# Patient Record
Sex: MALE | Race: WHITE | NOT HISPANIC OR LATINO | Employment: FULL TIME | URBAN - METROPOLITAN AREA
[De-identification: names, ages, dates, MRNs, and addresses within clinical notes are randomized per-mention and may not be internally consistent; named-entity substitution may affect disease eponyms.]

---

## 2017-01-06 ENCOUNTER — APPOINTMENT (OUTPATIENT)
Dept: LAB | Facility: HOSPITAL | Age: 67
End: 2017-01-06
Attending: ORTHOPAEDIC SURGERY
Payer: COMMERCIAL

## 2017-01-06 ENCOUNTER — TRANSCRIBE ORDERS (OUTPATIENT)
Dept: ADMINISTRATIVE | Facility: HOSPITAL | Age: 67
End: 2017-01-06

## 2017-01-06 ENCOUNTER — HOSPITAL ENCOUNTER (OUTPATIENT)
Dept: RADIOLOGY | Facility: HOSPITAL | Age: 67
Discharge: HOME | End: 2017-01-06
Attending: ORTHOPAEDIC SURGERY
Payer: COMMERCIAL

## 2017-01-06 DIAGNOSIS — M25.512 LEFT SHOULDER PAIN, UNSPECIFIED CHRONICITY: ICD-10-CM

## 2017-01-06 DIAGNOSIS — M75.122 COMPLETE ROTATOR CUFF TEAR OR RUPTURE OF LEFT SHOULDER, NOT SPECIFIED AS TRAUMATIC: Primary | ICD-10-CM

## 2017-01-06 DIAGNOSIS — M75.122 COMPLETE ROTATOR CUFF TEAR OR RUPTURE OF LEFT SHOULDER, NOT SPECIFIED AS TRAUMATIC: ICD-10-CM

## 2017-01-06 LAB
APTT PPP: 27 SECONDS (ref 24–36)
BASOPHILS # BLD AUTO: 0.03 THOUSANDS/ΜL (ref 0–0.1)
BASOPHILS NFR BLD AUTO: 0 % (ref 0–1)
EOSINOPHIL # BLD AUTO: 0.31 THOUSAND/ΜL (ref 0–0.61)
EOSINOPHIL NFR BLD AUTO: 4 % (ref 0–6)
ERYTHROCYTE [DISTWIDTH] IN BLOOD BY AUTOMATED COUNT: 12 % (ref 11.6–15.1)
HCT VFR BLD AUTO: 45.2 % (ref 36.5–49.3)
HGB BLD-MCNC: 15.3 G/DL (ref 12–17)
INR PPP: 1.01 (ref 0.86–1.16)
LYMPHOCYTES # BLD AUTO: 1.43 THOUSANDS/ΜL (ref 0.6–4.47)
LYMPHOCYTES NFR BLD AUTO: 19 % (ref 14–44)
MCH RBC QN AUTO: 28.3 PG (ref 26.8–34.3)
MCHC RBC AUTO-ENTMCNC: 33.8 G/DL (ref 31.4–37.4)
MCV RBC AUTO: 84 FL (ref 82–98)
MONOCYTES # BLD AUTO: 0.79 THOUSAND/ΜL (ref 0.17–1.22)
MONOCYTES NFR BLD AUTO: 11 % (ref 4–12)
NEUTROPHILS # BLD AUTO: 4.93 THOUSANDS/ΜL (ref 1.85–7.62)
NEUTS SEG NFR BLD AUTO: 66 % (ref 43–75)
NRBC BLD AUTO-RTO: 0 /100 WBCS
PLATELET # BLD AUTO: 189 THOUSANDS/UL (ref 149–390)
PMV BLD AUTO: 9.2 FL (ref 8.9–12.7)
PROTHROMBIN TIME: 13.2 SECONDS (ref 12–14.3)
RBC # BLD AUTO: 5.4 MILLION/UL (ref 3.88–5.62)
WBC # BLD AUTO: 7.5 THOUSAND/UL (ref 4.31–10.16)

## 2017-01-06 PROCEDURE — 36415 COLL VENOUS BLD VENIPUNCTURE: CPT

## 2017-01-06 PROCEDURE — 85730 THROMBOPLASTIN TIME PARTIAL: CPT

## 2017-01-06 PROCEDURE — 93005 ELECTROCARDIOGRAM TRACING: CPT

## 2017-01-06 PROCEDURE — 85610 PROTHROMBIN TIME: CPT

## 2017-01-06 PROCEDURE — 85025 COMPLETE CBC W/AUTO DIFF WBC: CPT

## 2017-01-06 PROCEDURE — 71020 HB CHEST X-RAY 2VW FRONTAL&LATL: CPT

## 2017-01-09 LAB
ATRIAL RATE: 56 BPM
P AXIS: 56 DEGREES
PR INTERVAL: 172 MS
QRS AXIS: 47 DEGREES
QRSD INTERVAL: 96 MS
QT INTERVAL: 414 MS
QTC INTERVAL: 399 MS
T WAVE AXIS: 52 DEGREES
VENTRICULAR RATE: 56 BPM

## 2017-01-09 RX ORDER — ROSUVASTATIN CALCIUM 40 MG/1
10 TABLET, COATED ORAL DAILY
COMMUNITY

## 2017-01-09 RX ORDER — CHLORAL HYDRATE 500 MG
1000 CAPSULE ORAL DAILY
COMMUNITY

## 2017-01-09 RX ORDER — VALSARTAN AND HYDROCHLOROTHIAZIDE 80; 12.5 MG/1; MG/1
1 TABLET, FILM COATED ORAL DAILY
COMMUNITY

## 2017-01-16 ENCOUNTER — ANESTHESIA (OUTPATIENT)
Dept: PERIOP | Facility: HOSPITAL | Age: 67
End: 2017-01-16
Payer: COMMERCIAL

## 2017-01-16 ENCOUNTER — ANESTHESIA EVENT (OUTPATIENT)
Dept: PERIOP | Facility: HOSPITAL | Age: 67
End: 2017-01-16
Payer: COMMERCIAL

## 2017-01-16 ENCOUNTER — HOSPITAL ENCOUNTER (OUTPATIENT)
Facility: HOSPITAL | Age: 67
Setting detail: OUTPATIENT SURGERY
Discharge: HOME | End: 2017-01-16
Attending: ORTHOPAEDIC SURGERY | Admitting: ORTHOPAEDIC SURGERY
Payer: COMMERCIAL

## 2017-01-16 VITALS
BODY MASS INDEX: 26.36 KG/M2 | HEIGHT: 69 IN | DIASTOLIC BLOOD PRESSURE: 84 MMHG | RESPIRATION RATE: 19 BRPM | SYSTOLIC BLOOD PRESSURE: 145 MMHG | TEMPERATURE: 97.9 F | WEIGHT: 178 LBS | OXYGEN SATURATION: 94 % | HEART RATE: 81 BPM

## 2017-01-16 DIAGNOSIS — M75.122 COMPLETE TEAR OF LEFT ROTATOR CUFF: Primary | ICD-10-CM

## 2017-01-16 PROBLEM — M25.519 SHOULDER PAIN: Status: ACTIVE | Noted: 2017-01-16

## 2017-01-16 PROBLEM — Z18.9: Status: ACTIVE | Noted: 2017-01-16

## 2017-01-16 PROBLEM — I10 HYPERTENSION: Status: ACTIVE | Noted: 2017-01-16

## 2017-01-16 PROBLEM — H44.709: Status: ACTIVE | Noted: 2017-01-16

## 2017-01-16 PROBLEM — C61 CA OF PROSTATE (HCC): Status: ACTIVE | Noted: 2017-01-16

## 2017-01-16 PROCEDURE — C1713 ANCHOR/SCREW BN/BN,TIS/BN: HCPCS | Performed by: ORTHOPAEDIC SURGERY

## 2017-01-16 DEVICE — SYSTEM IMPLANT SPEEDBRIDGE W/4.75 BCMPS SWIVELOCK C: Type: IMPLANTABLE DEVICE | Site: SHOULDER | Status: FUNCTIONAL

## 2017-01-16 RX ORDER — LIDOCAINE HYDROCHLORIDE 10 MG/ML
INJECTION, SOLUTION INFILTRATION; PERINEURAL AS NEEDED
Status: DISCONTINUED | OUTPATIENT
Start: 2017-01-16 | End: 2017-01-16 | Stop reason: SURG

## 2017-01-16 RX ORDER — FENTANYL CITRATE 50 UG/ML
INJECTION, SOLUTION INTRAMUSCULAR; INTRAVENOUS AS NEEDED
Status: DISCONTINUED | OUTPATIENT
Start: 2017-01-16 | End: 2017-01-16 | Stop reason: SURG

## 2017-01-16 RX ORDER — ROPIVACAINE HYDROCHLORIDE 5 MG/ML
INJECTION, SOLUTION EPIDURAL; INFILTRATION; PERINEURAL AS NEEDED
Status: DISCONTINUED | OUTPATIENT
Start: 2017-01-16 | End: 2017-01-16 | Stop reason: SURG

## 2017-01-16 RX ORDER — ALBUMIN, HUMAN INJ 5% 5 %
SOLUTION INTRAVENOUS CONTINUOUS PRN
Status: DISCONTINUED | OUTPATIENT
Start: 2017-01-16 | End: 2017-01-16 | Stop reason: SURG

## 2017-01-16 RX ORDER — EPHEDRINE SULFATE 50 MG/ML
INJECTION, SOLUTION INTRAVENOUS AS NEEDED
Status: DISCONTINUED | OUTPATIENT
Start: 2017-01-16 | End: 2017-01-16 | Stop reason: SURG

## 2017-01-16 RX ORDER — ONDANSETRON 2 MG/ML
INJECTION INTRAMUSCULAR; INTRAVENOUS AS NEEDED
Status: DISCONTINUED | OUTPATIENT
Start: 2017-01-16 | End: 2017-01-16 | Stop reason: SURG

## 2017-01-16 RX ORDER — MIDAZOLAM HYDROCHLORIDE 1 MG/ML
INJECTION INTRAMUSCULAR; INTRAVENOUS AS NEEDED
Status: DISCONTINUED | OUTPATIENT
Start: 2017-01-16 | End: 2017-01-16 | Stop reason: SURG

## 2017-01-16 RX ORDER — ONDANSETRON 2 MG/ML
4 INJECTION INTRAMUSCULAR; INTRAVENOUS ONCE AS NEEDED
Status: DISCONTINUED | OUTPATIENT
Start: 2017-01-16 | End: 2017-01-16 | Stop reason: HOSPADM

## 2017-01-16 RX ORDER — OXYCODONE HYDROCHLORIDE AND ACETAMINOPHEN 5; 325 MG/1; MG/1
1 TABLET ORAL EVERY 4 HOURS PRN
Qty: 50 TABLET | Refills: 0 | Status: SHIPPED | OUTPATIENT
Start: 2017-01-16 | End: 2017-01-26

## 2017-01-16 RX ORDER — SODIUM CHLORIDE, SODIUM LACTATE, POTASSIUM CHLORIDE, CALCIUM CHLORIDE 600; 310; 30; 20 MG/100ML; MG/100ML; MG/100ML; MG/100ML
50 INJECTION, SOLUTION INTRAVENOUS CONTINUOUS
Status: DISCONTINUED | OUTPATIENT
Start: 2017-01-16 | End: 2017-01-16 | Stop reason: HOSPADM

## 2017-01-16 RX ORDER — SUCCINYLCHOLINE CHLORIDE 20 MG/ML
INJECTION INTRAMUSCULAR; INTRAVENOUS AS NEEDED
Status: DISCONTINUED | OUTPATIENT
Start: 2017-01-16 | End: 2017-01-16 | Stop reason: SURG

## 2017-01-16 RX ORDER — CEPHALEXIN 500 MG/1
500 CAPSULE ORAL 4 TIMES DAILY
Qty: 4 CAPSULE | Refills: 0 | Status: SHIPPED | OUTPATIENT
Start: 2017-01-16 | End: 2017-01-17

## 2017-01-16 RX ORDER — LIDOCAINE HYDROCHLORIDE AND EPINEPHRINE 10; 10 MG/ML; UG/ML
INJECTION, SOLUTION INFILTRATION; PERINEURAL AS NEEDED
Status: DISCONTINUED | OUTPATIENT
Start: 2017-01-16 | End: 2017-01-16 | Stop reason: HOSPADM

## 2017-01-16 RX ORDER — SODIUM CHLORIDE, SODIUM LACTATE, POTASSIUM CHLORIDE, CALCIUM CHLORIDE 600; 310; 30; 20 MG/100ML; MG/100ML; MG/100ML; MG/100ML
50 INJECTION, SOLUTION INTRAVENOUS CONTINUOUS
Status: DISCONTINUED | OUTPATIENT
Start: 2017-01-16 | End: 2017-01-16

## 2017-01-16 RX ORDER — DEXTROSE AND SODIUM CHLORIDE 5; .45 G/100ML; G/100ML
50 INJECTION, SOLUTION INTRAVENOUS CONTINUOUS
Status: DISCONTINUED | OUTPATIENT
Start: 2017-01-16 | End: 2017-01-16 | Stop reason: HOSPADM

## 2017-01-16 RX ORDER — PROPOFOL 10 MG/ML
INJECTION, EMULSION INTRAVENOUS AS NEEDED
Status: DISCONTINUED | OUTPATIENT
Start: 2017-01-16 | End: 2017-01-16 | Stop reason: SURG

## 2017-01-16 RX ORDER — FENTANYL CITRATE/PF 50 MCG/ML
25 SYRINGE (ML) INJECTION
Status: DISCONTINUED | OUTPATIENT
Start: 2017-01-16 | End: 2017-01-16 | Stop reason: HOSPADM

## 2017-01-16 RX ADMIN — DEXAMETHASONE SODIUM PHOSPHATE 4 MG: 10 INJECTION INTRAMUSCULAR; INTRAVENOUS at 08:31

## 2017-01-16 RX ADMIN — FENTANYL CITRATE 100 MCG: 50 INJECTION, SOLUTION INTRAMUSCULAR; INTRAVENOUS at 08:31

## 2017-01-16 RX ADMIN — SUCCINYLCHOLINE CHLORIDE 120 MG: 20 INJECTION, SOLUTION INTRAMUSCULAR; INTRAVENOUS at 08:31

## 2017-01-16 RX ADMIN — PROPOFOL 200 MG: 10 INJECTION, EMULSION INTRAVENOUS at 08:31

## 2017-01-16 RX ADMIN — PHENYLEPHRINE HYDROCHLORIDE 25 MCG/MIN: 10 INJECTION, SOLUTION INTRAMUSCULAR; INTRAVENOUS; SUBCUTANEOUS at 08:40

## 2017-01-16 RX ADMIN — ONDANSETRON 4 MG: 2 INJECTION INTRAMUSCULAR; INTRAVENOUS at 10:23

## 2017-01-16 RX ADMIN — CEFAZOLIN SODIUM 2000 MG: 2 SOLUTION INTRAVENOUS at 08:38

## 2017-01-16 RX ADMIN — ROPIVACAINE HYDROCHLORIDE 5 ML: 5 INJECTION, SOLUTION EPIDURAL; INFILTRATION; PERINEURAL at 07:39

## 2017-01-16 RX ADMIN — ROPIVACAINE HYDROCHLORIDE 5 ML: 5 INJECTION, SOLUTION EPIDURAL; INFILTRATION; PERINEURAL at 07:45

## 2017-01-16 RX ADMIN — ROPIVACAINE HYDROCHLORIDE 5 ML: 5 INJECTION, SOLUTION EPIDURAL; INFILTRATION; PERINEURAL at 07:43

## 2017-01-16 RX ADMIN — ROPIVACAINE HYDROCHLORIDE 5 ML: 5 INJECTION, SOLUTION EPIDURAL; INFILTRATION; PERINEURAL at 07:41

## 2017-01-16 RX ADMIN — SODIUM CHLORIDE, SODIUM LACTATE, POTASSIUM CHLORIDE, AND CALCIUM CHLORIDE 50 ML/HR: .6; .31; .03; .02 INJECTION, SOLUTION INTRAVENOUS at 07:20

## 2017-01-16 RX ADMIN — ALBUMIN HUMAN: 0.05 INJECTION, SOLUTION INTRAVENOUS at 08:40

## 2017-01-16 RX ADMIN — LIDOCAINE HYDROCHLORIDE 50 MG: 10 INJECTION, SOLUTION INFILTRATION; PERINEURAL at 08:31

## 2017-01-16 RX ADMIN — EPHEDRINE SULFATE 5 MG: 50 INJECTION, SOLUTION INTRAMUSCULAR; INTRAVENOUS; SUBCUTANEOUS at 09:03

## 2017-01-16 RX ADMIN — MIDAZOLAM HYDROCHLORIDE 2 MG: 1 INJECTION, SOLUTION INTRAMUSCULAR; INTRAVENOUS at 07:38

## 2017-01-20 ENCOUNTER — ALLSCRIPTS OFFICE VISIT (OUTPATIENT)
Dept: OTHER | Facility: OTHER | Age: 67
End: 2017-01-20

## 2017-01-20 ENCOUNTER — HOSPITAL ENCOUNTER (OUTPATIENT)
Dept: RADIOLOGY | Facility: MEDICAL CENTER | Age: 67
Discharge: HOME | End: 2017-01-20
Payer: COMMERCIAL

## 2017-01-20 DIAGNOSIS — M75.122 COMPLETE ROTATOR CUFF TEAR OF LEFT SHOULDER: ICD-10-CM

## 2017-01-20 PROCEDURE — 73030 X-RAY EXAM OF SHOULDER: CPT

## 2017-02-07 ENCOUNTER — GENERIC CONVERSION - ENCOUNTER (OUTPATIENT)
Dept: OTHER | Facility: OTHER | Age: 67
End: 2017-02-07

## 2017-03-07 ENCOUNTER — ALLSCRIPTS OFFICE VISIT (OUTPATIENT)
Dept: OTHER | Facility: OTHER | Age: 67
End: 2017-03-07

## 2017-04-04 ENCOUNTER — GENERIC CONVERSION - ENCOUNTER (OUTPATIENT)
Dept: OTHER | Facility: OTHER | Age: 67
End: 2017-04-04

## 2017-04-05 ENCOUNTER — ALLSCRIPTS OFFICE VISIT (OUTPATIENT)
Dept: OTHER | Facility: OTHER | Age: 67
End: 2017-04-05

## 2017-05-10 ENCOUNTER — GENERIC CONVERSION - ENCOUNTER (OUTPATIENT)
Dept: OTHER | Facility: OTHER | Age: 67
End: 2017-05-10

## 2017-05-16 ENCOUNTER — ALLSCRIPTS OFFICE VISIT (OUTPATIENT)
Dept: OTHER | Facility: OTHER | Age: 67
End: 2017-05-16

## 2017-06-21 ENCOUNTER — GENERIC CONVERSION - ENCOUNTER (OUTPATIENT)
Dept: OTHER | Facility: OTHER | Age: 67
End: 2017-06-21

## 2017-06-27 ENCOUNTER — ALLSCRIPTS OFFICE VISIT (OUTPATIENT)
Dept: OTHER | Facility: OTHER | Age: 67
End: 2017-06-27

## 2017-10-16 NOTE — PROGRESS NOTES
Chief Complaint  Shoulder pain resolved      Post-Op  Post-Op Shoulder:   Sonia Mir is status post rotator cuff repair of the left shoulder  HPI: The patient reports no excessive pain-- and-- no swelling  PE: The surgical incision site was healed  The shoulder demonstrates no warmth,-- no erythema,-- no swelling-- and-- no tenderness  ROM as expected given post-op status  The patient is progressing well with ROM  Strength as expected given post-op status  The patient is progressing well with strength  Peripheral neurovascular exam reveals intact sensation to light touch-- and-- intact gross motor function  Assessment: Post-op, the patient is doing well  Plan: Activity Restrictions: None-- and-- Advance as tolerated  Follow up: As needed  Active Problems  1  Eye foreign body (930 9) (T15 90XA)   2  Hypertension (401 9) (I10)   3  Left shoulder pain (719 41) (M25 512)   4  Nontraumatic complete tear of rotator cuff, left (727 61) (M75 122)   5  Prostate cancer (185) (C61)   6  Status post arthroscopy of shoulder (V45 89) (Z98 890)   7  Status post complete repair of rotator cuff (V45 89) (O19 988)    Social History   · Marital History - Currently    · Never A Smoker    Current Meds   1  Aspirin 81 MG TABS; Therapy: (Recorded:04Jun2013) to Recorded   2  Crestor 40 MG Oral Tablet; Therapy: (Recorded:04Jun2013) to Recorded   3  Diovan HCT 80-12 5 MG Oral Tablet; Therapy: (Recorded:04Jun2013) to Recorded   4  Fish Oil 1000 MG Oral Capsule; Therapy: (Recorded:04Jun2013) to Recorded   5  Valsartan TABS Recorded   6  Vitamin D CAPS; Therapy: (Recorded:04Jun2013) to Recorded    Allergies  1  No Known Drug Allergies  2  No Known Environmental Allergies   3   No Known Food Allergies    Signatures   Electronically signed by : GISELE Alamo ; Oct 15 2017 11:02PM EST                       (Author)

## 2018-01-10 NOTE — PROGRESS NOTES
Assessment    1  Nontraumatic complete tear of rotator cuff, left (727 61) (M75 122)   2  Status post arthroscopy of shoulder (V45 89) (B25 942)    Plan  Nontraumatic complete tear of rotator cuff, left    · Follow-up Visit in 4 Weeks Evaluation and Treatment  Follow-up  Status: Complete   Done: [id number]    Discussion/Summary    Patient is status post 7 weeks left shoulder rotator cuff repair  Patient doing well, pain well controlled  No prescription for physical therapy was given to the patient today  Patient will add active assist range of motion to his protocol  Patient will now be doing active assist range of motion along with passive range of motion exercises  No strengthening program at this time  Patient will come back in 4 weeks for reevaluation and we'll possibly discuss adding strengthening program at that point  The treatment plan was reviewed with the patient/guardian  The patient/guardian understands and agrees with the treatment plan      Chief Complaint  Status post left shoulder arthroscopy      Post-Op  Post-Op Shoulder:   Davidson Needs is status post arthroscopic rotator cuff repair and subacromial decompression of the left shoulder  The surgery was performed on 1/16/2017  HPI: The patient reports no excessive pain, no swelling, no fever, no shortness of breath, no calf swelling and no leg pain  PE: The surgical incision site was clean, dry and intact and not healed  The shoulder demonstrates no warmth, no induration, no erythema, no ecchymosis, no swelling and no tenderness  ROM as expected given post-op status  The patient is progressing well with ROM  Strength as expected given post-op status  The patient is progressing well with strength  Strength and active range of motion not measured today secondary to recent surgery  Peripheral neurovascular exam reveals intact sensation to light touch and intact gross motor function   Testing for DVT is negative with soft and non-tender calves and no palpable cords  Assessment: Post-op, the patient is doing well, has excellent pain control and is showing no signs of infection  Plan: Activity Restrictions: per protocol  PT Referral:  I hereby certify that these services are medically necessary  HPI: 50-year-old male enters today wearing a shoulder immobilizer for the left shoulder  Patient is 7 weeks out rotator cuff repair  Has been in physical therapy working on passive range of motion area patient states physical therapy is passively able to get shoulder almost completely elevated  Still has some restrictions with external rotation  Patient states pain is well-controlled  No reports of numbness or tingling in the upper extremity      Review of Systems    Constitutional: No fever or chills, feels well, no tiredness, no recent weight loss or weight gain  Eyes: No complaints of red eyes, no eyesight problems  ENT: no complaints of loss of hearing, no nosebleeds, no sore throat  Cardiovascular: No complaints of chest pain, no palpitations, no leg claudication or lower extremity edema  Respiratory: No complaints of shortness of breath, no wheezing, no cough  Gastrointestinal: No complaints of abdominal pain, no constipation, no nausea or vomiting, no diarrhea or bloody stools  Genitourinary: No complaints of dysuria or incontinence, no hesitancy, no nocturia  Musculoskeletal: as noted in HPI  Integumentary: No complaints of skin rash or lesion, no itching or dry skin, no skin wounds  Neurological: No complaints of headache, no confusion, no numbness or tingling, no dizziness  Psychiatric: No suicidal thoughts, no anxiety, no depression  Endocrine: No muscle weakness, no frequent urination, no excessive thirst, no feelings of weakness  ROS reviewed  Active Problems    1  Eye foreign body (930 9) (T15 90XA)   2  Hypertension (401 9) (I10)   3  Left shoulder pain (719 41) (M25 512)   4   Nontraumatic complete tear of rotator cuff, left (727 61) (M75 122)   5  Prostate cancer (185) (C61)   6  Status post arthroscopy of shoulder (V42 89) (Z98 224)    Social History    · Marital History - Currently    · Never A Smoker  The social history was reviewed and updated today  The social history was reviewed and is unchanged  Current Meds   1  Aspirin 81 MG TABS; Therapy: (Recorded:04Jun2013) to Recorded   2  Crestor 40 MG Oral Tablet; Therapy: (Recorded:04Jun2013) to Recorded   3  Diovan HCT 80-12 5 MG Oral Tablet; Therapy: (Recorded:04Jun2013) to Recorded   4  Fish Oil 1000 MG Oral Capsule; Therapy: (Recorded:04Jun2013) to Recorded   5  Vitamin D CAPS; Therapy: (Recorded:04Jun2013) to Recorded    Allergies    1  No Known Drug Allergies    2  No Known Environmental Allergies   3  No Known Food Allergies    Vitals   Recorded: 43TBH3999 03:18PM   Heart Rate 69   Systolic 744   Diastolic 88   Height 5 ft 9 in   Weight 182 lb    BMI Calculated 26 88   BSA Calculated 1 98     Physical Exam    Constitutional - General appearance: Normal    Psychiatric - Orientation to person, place, and time: Normal  Mood and affect: Normal       Future Appointments    Date/Time Provider Specialty Site   04/04/2017 03:15 PM GISELE Metcalf   Orthopedic Surgery [facility]     Signatures   Electronically signed by : Ramona Stafford [facility]; Mar  7 2017  4:02PM EST                       (Author)    Electronically signed by : GISELE Latif ; Mar  8 2017  4:23PM EST                       (Author)

## 2018-01-12 NOTE — MISCELLANEOUS
Message  Return to work or school:    He is able to return to work on  06/27/2017 [address]   Electronically signed by : GISELE Tran ; Oct 15 2017 10:55PM EST                       (Author)

## 2018-01-12 NOTE — RESULT NOTES
Verified Results  * XR SHOULDER 2+ VIEW LEFT 28Oct2016 01:24PM Hannah Morgan Order Number: [id number]   Performing Comments: rm 1     Test Name Result Flag Reference   XR SHOULDER 2+ VW LEFT (Report)     LEFT SHOULDER     INDICATION: Left shoulder pain , Diminished range of motion   COMPARISON: None     VIEWS: 3; 3 images     FINDINGS:     There is no acute fracture or dislocation  Minimal degenerative changes involve the acromioclavicular and glenohumeral joints  No lytic or blastic lesions are seen  Soft tissues are unremarkable  IMPRESSION:   Minimal degenerative arthritis   No acute osseous abnormality         Workstation performed: [id number]     Signed by:   Elias Pleitez MD   10/28/16

## 2018-01-13 VITALS
HEART RATE: 69 BPM | DIASTOLIC BLOOD PRESSURE: 88 MMHG | WEIGHT: 182 LBS | HEIGHT: 69 IN | SYSTOLIC BLOOD PRESSURE: 150 MMHG | BODY MASS INDEX: 26.96 KG/M2

## 2018-01-13 VITALS
WEIGHT: 181 LBS | DIASTOLIC BLOOD PRESSURE: 81 MMHG | HEART RATE: 73 BPM | BODY MASS INDEX: 26.81 KG/M2 | HEIGHT: 69 IN | SYSTOLIC BLOOD PRESSURE: 134 MMHG

## 2018-01-13 VITALS
WEIGHT: 182 LBS | HEIGHT: 69 IN | HEART RATE: 76 BPM | SYSTOLIC BLOOD PRESSURE: 163 MMHG | BODY MASS INDEX: 26.96 KG/M2 | DIASTOLIC BLOOD PRESSURE: 94 MMHG

## 2018-01-14 VITALS
SYSTOLIC BLOOD PRESSURE: 149 MMHG | WEIGHT: 185 LBS | HEIGHT: 69 IN | DIASTOLIC BLOOD PRESSURE: 80 MMHG | HEART RATE: 84 BPM | BODY MASS INDEX: 27.4 KG/M2

## (undated) DEVICE — SCD SEQUENTIAL COMPRESSION COMFORT SLEEVE MEDIUM KNEE LENGTH: Brand: KENDALL SCD

## (undated) DEVICE — CHLORAPREP HI-LITE 26ML ORANGE

## (undated) DEVICE — GLOVE INDICATOR PI UNDERGLOVE SZ 8.5 BLUE

## (undated) DEVICE — SUT VICRYL 2-0 CT-1 27 IN J259H

## (undated) DEVICE — LIGHT HANDLE COVER CAMERA DISP

## (undated) DEVICE — SHOULDER SUSPENSION KIT 6 PER BOX

## (undated) DEVICE — DRESSING MEPILEX AG BORDER 4 X 8 IN

## (undated) DEVICE — BLADE SHAVER CUTTER BN 4MM 13CM COOLCUT

## (undated) DEVICE — INTENDED FOR TISSUE SEPARATION, AND OTHER PROCEDURES THAT REQUIRE A SHARP SURGICAL BLADE TO PUNCTURE OR CUT.: Brand: BARD-PARKER SAFETY BLADES SIZE 11, STERILE

## (undated) DEVICE — DIGIT TRAP FINGER GRASPING DEVICE: Brand: DIGIT TRAP

## (undated) DEVICE — NEEDLE SUT SCORPION MULTIFIRE

## (undated) DEVICE — DRESSING MEPILEX BORDER 4 X 8 IN

## (undated) DEVICE — PROBE ABLATION  APOLLO RF 90 DEG MULTI PORT

## (undated) DEVICE — PACK MAJOR ORTHO W/SPLITS PBDS

## (undated) DEVICE — 3M™ STERI-STRIP™ REINFORCED ADHESIVE SKIN CLOSURES, R1547, 1/2 IN X 4 IN (12 MM X 100 MM), 6 STRIPS/ENVELOPE: Brand: 3M™ STERI-STRIP™

## (undated) DEVICE — ARTHROSCOPY FLOOR MAT

## (undated) DEVICE — NEEDLE SPINAL18G X 3.5 IN QUINCKE

## (undated) DEVICE — TUBING SUCTION 5MM X 12 FT

## (undated) DEVICE — SUT ETHILON 3-0 PS-1 18 IN 1663H

## (undated) DEVICE — TUBING ARTHROSCOPIC WAVE  MAIN PUMP

## (undated) DEVICE — CANNULA 7 X70MM THRD SEAL SIDE PORT

## (undated) DEVICE — GLOVE SRG BIOGEL 8.5

## (undated) DEVICE — IMPERVIOUS STOCKINETTE: Brand: DEROYAL

## (undated) DEVICE — 3M™ IOBAN™ 2 ANTIMICROBIAL INCISE DRAPE 6650EZ: Brand: IOBAN™ 2